# Patient Record
Sex: MALE | Race: WHITE | Employment: OTHER | URBAN - METROPOLITAN AREA
[De-identification: names, ages, dates, MRNs, and addresses within clinical notes are randomized per-mention and may not be internally consistent; named-entity substitution may affect disease eponyms.]

---

## 2023-10-02 ENCOUNTER — HOSPITAL ENCOUNTER (OUTPATIENT)
Age: 73
Discharge: HOME | End: 2023-10-02
Payer: OTHER GOVERNMENT

## 2023-10-02 LAB
ALBUMIN SERPL-MCNC: 3.8 G/DL (ref 3.4–5)
ALBUMIN/GLOB SERPL: 1 {RATIO} (ref 1.1–2.2)
ALP SERPL-CCNC: 125 U/L (ref 40–129)
ALT SERPL-CCNC: 16 U/L (ref 10–40)
ANION GAP SERPL CALCULATED.3IONS-SCNC: 9 MMOL/L (ref 3–16)
AST SERPL-CCNC: 9 U/L (ref 15–37)
BILIRUB SERPL-MCNC: 0.9 MG/DL (ref 0–1)
BILIRUB UR QL STRIP.AUTO: NEGATIVE
BUN SERPL-MCNC: 29 MG/DL (ref 7–20)
CALCIUM SERPL-MCNC: 9.2 MG/DL (ref 8.3–10.6)
CHLORIDE SERPL-SCNC: 101 MMOL/L (ref 99–110)
CLARITY UR: CLEAR
CO2 SERPL-SCNC: 30 MMOL/L (ref 21–32)
COLOR UR: YELLOW
CREAT SERPL-MCNC: 2.1 MG/DL (ref 0.8–1.3)
GFR SERPLBLD CREATININE-BSD FMLA CKD-EPI: 33 ML/MIN/{1.73_M2}
GLUCOSE SERPL-MCNC: 107 MG/DL (ref 70–99)
GLUCOSE UR STRIP.AUTO-MCNC: NEGATIVE MG/DL
HGB UR QL STRIP.AUTO: NEGATIVE
KETONES UR STRIP.AUTO-MCNC: NEGATIVE MG/DL
LEUKOCYTE ESTERASE UR QL STRIP.AUTO: NEGATIVE
NITRITE UR QL STRIP.AUTO: NEGATIVE
PH UR STRIP.AUTO: 6.5 [PH] (ref 5–8)
POTASSIUM SERPL-SCNC: 3.8 MMOL/L (ref 3.5–5.1)
PROT SERPL-MCNC: 7.5 G/DL (ref 6.4–8.2)
PROT UR STRIP.AUTO-MCNC: NEGATIVE MG/DL
SODIUM SERPL-SCNC: 140 MMOL/L (ref 136–145)
SP GR UR STRIP.AUTO: 1.01 (ref 1–1.03)
UA DIPSTICK W REFLEX MICRO PNL UR: ABNORMAL
URN SPEC COLLECT METH UR: ABNORMAL
UROBILINOGEN UR STRIP-ACNC: 2 E.U./DL

## 2023-10-02 PROCEDURE — 81003 URINALYSIS AUTO W/O SCOPE: CPT

## 2023-10-02 PROCEDURE — 80053 COMPREHEN METABOLIC PANEL: CPT

## 2024-02-12 ENCOUNTER — HOSPITAL ENCOUNTER (OUTPATIENT)
Age: 74
Discharge: HOME | End: 2024-02-12
Payer: COMMERCIAL

## 2024-02-12 ENCOUNTER — HOSPITAL ENCOUNTER (OUTPATIENT)
Dept: GENERAL RADIOLOGY | Age: 74
Discharge: HOME | End: 2024-02-12
Payer: COMMERCIAL

## 2024-02-12 DIAGNOSIS — Z00.00 ROUTINE GENERAL MEDICAL EXAMINATION AT A HEALTH CARE FACILITY: ICD-10-CM

## 2024-02-12 PROCEDURE — 73630 X-RAY EXAM OF FOOT: CPT

## 2024-06-12 ENCOUNTER — TELEPHONE (OUTPATIENT)
Age: 74
End: 2024-06-12

## 2024-06-12 NOTE — TELEPHONE ENCOUNTER
Jorge from the [facility] called about pt and wanted to make sure they were scheduled. He said it was faxed on 6/6/24 and I didn't see if in there. Please call Jorge at [phone] and let him know whenever the patient gets scheduled.

## 2024-06-17 NOTE — TELEPHONE ENCOUNTER
Have spoken with Ayesha NG, RN manager cardiology at [facility]. Requested CT scan/echo be pushed thru PACS or burnt on disc and mailed.  No images yet received. LMOR for care coordinator Jorge at [phone] to call back and help expedite procurement of the images to allow for scheduling of the appt. Await a return call.

## 2024-06-18 NOTE — TELEPHONE ENCOUNTER
Spoke with Jorge at [facility]. Requested CD of images for CT scan, echo, any images available to assess aorta and aortic valve. Would prefer to have images pushed thru to PACS but may not be possible. Await a CD or films.

## 2024-06-24 ENCOUNTER — TELEPHONE (OUTPATIENT)
Age: 74
End: 2024-06-24

## 2024-06-24 NOTE — TELEPHONE ENCOUNTER
Spoke with Paige at the [facility] radiology dept.  CDs of studies requested were mailed out 6/10/24 and not yet received. States it may take up to 3 weeks to receive. They don't push images thru to [facility] PACS. Will wait until next week to determine if films are received. If not will readdress with the [facility].

## 2024-06-25 NOTE — TELEPHONE ENCOUNTER
Spoke with Paige at [facility]. Disks mailed 6/10/24. Stated can take up to 3 weeks. Will continue to follow up and if not received by Fri will request duplicate copies to be sent.

## 2024-06-27 ENCOUNTER — TELEPHONE (OUTPATIENT)
Age: 74
End: 2024-06-27

## 2024-06-27 NOTE — TELEPHONE ENCOUNTER
Arline with the [facility] called and wanted to know if you got the imaging for pt. If not she said she can overnight a disc. She would like to know either way. Her direct line is [phone].

## 2024-07-12 ENCOUNTER — OFFICE VISIT (OUTPATIENT)
Age: 74
End: 2024-07-12
Payer: OTHER GOVERNMENT

## 2024-07-12 VITALS
HEART RATE: 51 BPM | SYSTOLIC BLOOD PRESSURE: 140 MMHG | WEIGHT: 233 LBS | OXYGEN SATURATION: 96 % | HEIGHT: 72 IN | TEMPERATURE: 97.9 F | BODY MASS INDEX: 31.56 KG/M2 | DIASTOLIC BLOOD PRESSURE: 80 MMHG

## 2024-07-12 DIAGNOSIS — I71.21 ANEURYSM OF ASCENDING AORTA WITHOUT RUPTURE (HCC): Primary | ICD-10-CM

## 2024-07-12 PROCEDURE — 1123F ACP DISCUSS/DSCN MKR DOCD: CPT | Performed by: THORACIC SURGERY (CARDIOTHORACIC VASCULAR SURGERY)

## 2024-07-12 PROCEDURE — 99205 OFFICE O/P NEW HI 60 MIN: CPT | Performed by: THORACIC SURGERY (CARDIOTHORACIC VASCULAR SURGERY)

## 2024-07-12 RX ORDER — UREA 200 MG/G
CREAM TOPICAL PRN
COMMUNITY

## 2024-07-12 RX ORDER — SERTRALINE HYDROCHLORIDE 100 MG/1
100 TABLET, FILM COATED ORAL DAILY
COMMUNITY

## 2024-07-12 RX ORDER — ALLOPURINOL 300 MG/1
300 TABLET ORAL DAILY
COMMUNITY

## 2024-07-12 RX ORDER — KETOCONAZOLE 20 MG/ML
SHAMPOO TOPICAL DAILY PRN
COMMUNITY

## 2024-07-12 RX ORDER — HYDROXYZINE HYDROCHLORIDE 25 MG/1
25 TABLET, FILM COATED ORAL 3 TIMES DAILY PRN
COMMUNITY

## 2024-07-12 RX ORDER — ASPIRIN 81 MG/1
81 TABLET ORAL DAILY
COMMUNITY

## 2024-07-12 RX ORDER — TRAZODONE HYDROCHLORIDE 100 MG/1
100 TABLET ORAL NIGHTLY
COMMUNITY

## 2024-07-12 RX ORDER — ATORVASTATIN CALCIUM 80 MG/1
80 TABLET, FILM COATED ORAL DAILY
COMMUNITY

## 2024-07-12 RX ORDER — AMLODIPINE BESYLATE 5 MG/1
5 TABLET ORAL DAILY
COMMUNITY

## 2024-07-12 RX ORDER — METOPROLOL SUCCINATE 100 MG/1
100 TABLET, EXTENDED RELEASE ORAL DAILY
COMMUNITY

## 2024-07-12 RX ORDER — CHLORDIAZEPOXIDE HYDROCHLORIDE 25 MG/1
25 CAPSULE, GELATIN COATED ORAL 3 TIMES DAILY PRN
COMMUNITY

## 2024-07-12 ASSESSMENT — ENCOUNTER SYMPTOMS
GASTROINTESTINAL NEGATIVE: 1
ALLERGIC/IMMUNOLOGIC NEGATIVE: 1
SHORTNESS OF BREATH: 1
EYES NEGATIVE: 1

## 2024-07-12 NOTE — H&P
found.    Assessment        Plan   We had a good discussion about these findings. There is no indication for aortic intervention at this time. He could reach surgical thresholds depending on his age/vigor at 5cm (class 2a, with an experience aortic surgeon/center like here) or 5.5cm (class 1). I explained that while his aneurysm should not be considered a 'ticking time bomb', that no surgery does not equal 'no risk'. We talked about the serious issue of avoiding significant straining/Valsalva activities and provided multiple relevant examples. In addition, I mentioned the 2018 FDA black-box warning regarding fluoroquinolone antibiotics (like Cipro) in patients with thoracic aneurysm - a higher rate of aortic rupture and should be avoided unless there is truly no other choice. His siblings and children should be screened for thoracic aortopathy with echocardiogram (ultrasound). If any aortic dilatation is detected by echo, it should be followed by a formal aortic study (CTA or MRI). We will arrange for an ECG-gated CTA in 6 months to assess for any accelerated growth, and we will extend the intervals of surveillance thereafter. In the meantime, with his mild symptoms of shortness of breath with exertion and his coronary calcifications, please consider a stress test to be sure there is no actionable coronary disease. Similarly, since aortic valve disease is fairly common in the setting of thoracic aneurysm, a baseline echocardiogram would also be thoughtful. Thank you for the opportunity to participate in Parker's care.    More than 60-minutes was spent on chart and direct image review, patient interview and counseling, documentation and communication with his care team.    Consultations Ordered:  None    Electronically signed by Jaime Guillory MD on 7/12/24 at 9:10 AM EDT

## 2024-08-06 ENCOUNTER — TELEPHONE (OUTPATIENT)
Age: 74
End: 2024-08-06

## 2024-08-06 NOTE — TELEPHONE ENCOUNTER
Spoke with Ayesha NG, RN for cardiology at [facility]. States has not seen pt since 2015. Per Dr. Guillory, recommendations for echo and stress test were sent to his PCP, Dr Sosa. Contacted Dr. Sosa's office and LMOR requesting call back to discuss getting testing sched. Order for f/u ECG gaited CTA chest/abd/pelvis placed in epic as directed by Dr. Guillory in his OV note of 7/12/24. Await a return call from Dr. Sheila Pace's LPN.

## 2024-08-12 ENCOUNTER — TELEPHONE (OUTPATIENT)
Age: 74
End: 2024-08-12

## 2024-08-12 DIAGNOSIS — I71.21 ANEURYSM OF ASCENDING AORTA WITHOUT RUPTURE (HCC): Primary | ICD-10-CM

## 2024-08-12 NOTE — TELEPHONE ENCOUNTER
Per Katelynn, with Dr. Sosa, pt had discussion with PCP regarding recommendation for baseline echo and GXT for c/o SOB and AVD. Pt stated that he is not interested in completing these tests at this time as he feels well and does not feel they are necessary. Continue to monitor the TAA with ECG gaited CTA chest after 12/1/24. Order placed in epic for the CTA chest.